# Patient Record
Sex: FEMALE | Race: AMERICAN INDIAN OR ALASKA NATIVE
[De-identification: names, ages, dates, MRNs, and addresses within clinical notes are randomized per-mention and may not be internally consistent; named-entity substitution may affect disease eponyms.]

---

## 2018-03-08 ENCOUNTER — HOSPITAL ENCOUNTER (EMERGENCY)
Dept: HOSPITAL 42 - ED | Age: 63
Discharge: HOME | End: 2018-03-08
Payer: COMMERCIAL

## 2018-03-08 VITALS — TEMPERATURE: 97.5 F | OXYGEN SATURATION: 99 % | RESPIRATION RATE: 18 BRPM

## 2018-03-08 VITALS — SYSTOLIC BLOOD PRESSURE: 144 MMHG | HEART RATE: 66 BPM | DIASTOLIC BLOOD PRESSURE: 80 MMHG

## 2018-03-08 VITALS — BODY MASS INDEX: 25 KG/M2

## 2018-03-08 DIAGNOSIS — E11.9: ICD-10-CM

## 2018-03-08 DIAGNOSIS — I10: ICD-10-CM

## 2018-03-08 DIAGNOSIS — M25.511: Primary | ICD-10-CM

## 2018-03-08 DIAGNOSIS — M25.512: ICD-10-CM

## 2018-03-08 NOTE — RAD
PROCEDURE:  BILATERAL SHOULDER RADIOGRAPHS



HISTORY:

r/o fx



COMPARISON:

None available



TECHNIQUE:

Three views of the shoulder been submitted for evaluation of fracture.



FINDINGS:

No acute fracture or destructive bony lesion identified bilaterally. 

There is no subluxation or dislocation including the bilateral 

acromioclavicular and glenohumeral joints. No destructive bony lesion 

is appreciated bilaterally.  Limited degenerative changes are 

identified in the bilateral sacroiliac acromioclavicular and 

glenohumeral joints. Evidence of limited calcific tendinopathy left 

shoulder.



IMPRESSION:

No acute fracture or dislocation bilaterally.  Calcific tendinopathy 

left shoulder.  Mild degenerative change bilaterally.

## 2018-03-08 NOTE — ED PDOC
Arrival/HPI





- General


Chief Complaint: Trauma


Time Seen by Provider: 03/08/18 08:19


Historian: Patient





- History of Present Illness


Narrative History of Present Illness (Text): 


03/08/18 13:17


Haylee Hernández is a 62 year old female, with no significant past medical 

history, who presents to the emergency department complaining of bilateral 

shoulder pain s/p fall. Patient slipped and fell on her shoulders while 

walking. Patient denies any head injury, LOC, fevers, chills, chest pain, 

shortness of breath, abdominal pain, nausea, vomiting, diarrhea, back pain, 

neck pain, headache, dizziness, or any other complaint. 





Time/Duration: Other (today)


Symptom Course: Unchanged


Activities at Onset: Light


Context: Walking





Past Medical History





- Provider Review


Nursing Documentation Reviewed: Yes





- Reproductive


Menopause: Yes





- Cardiac


Hx Hypertension: Yes





- Pulmonary


Hx Respiratory Disorders: No





- Endocrine/Metabolic


Hx Diabetes Mellitus Type 2: Yes





- Psychiatric


Hx Substance Use: No





- Anesthesia


Hx Anesthesia: No





Family/Social History





- Physician Review


Nursing Documentation Reviewed: Yes


Family/Social History: Unknown Family HX


Smoking Status: Unknown If Ever Smoked


Hx Alcohol Use: No


Hx Substance Use: No





Allergies/Home Meds


Allergies/Adverse Reactions: 


Allergies





No Known Allergies Allergy (Unverified 03/08/18 08:20)


 








Home Medications: 


 Home Meds











 Medication  Instructions  Recorded  Confirmed


 


MetFORMIN [glucOPHAGE] 1,000 mg PO BID 03/08/18 03/08/18


 


Valsartan/Hydrochlorothiazide 1 tab PO DAILY 03/08/18 03/08/18





[Valsartan and Hydrochlorothiazide   





25 mg-320 M]   














Review of Systems





- Physician Review


All systems were reviewed & negative as marked: Yes





- Review of Systems


Constitutional: Normal


Eyes: Normal


ENT: Normal


Respiratory: Normal.  absent: SOB, Cough


Cardiovascular: Normal.  absent: Chest Pain, Palpitations


Gastrointestinal: Normal.  absent: Abdominal Pain, Diarrhea, Nausea, Vomiting


Genitourinary Female: Normal.  absent: Dysuria, Frequency, Hematuria, Urine 

Output Changes


Musculoskeletal: Other (Bilateral shoulder pain).  absent: Back Pain, Neck Pain


Skin: Normal.  absent: Rash


Neurological: Normal.  absent: Headache, Dizziness


Endocrine: Normal


Hemo/Lymphatic: Normal


Psychiatric: Normal





Physical Exam


Vital Signs Reviewed: Yes


Vital Signs











  Temp Pulse Resp BP Pulse Ox


 


 03/08/18 10:30   66  18  144/80  99


 


 03/08/18 08:06  97.5 F L  63  18  163/92 H  99











Temperature: Afebrile


Blood Pressure: Normal


Pulse: Regular


Respiratory Rate: Normal


Appearance: Positive for: Well-Appearing, Non-Toxic, Comfortable


Pain Distress: None


Mental Status: Positive for: Alert and Oriented X 3


Finger Stick Blood Glucose: 167





- Systems Exam


Head: Present: Atraumatic, Normocephalic


Pupils: Present: PERRL


Extroacular Muscles: Present: EOMI


Conjunctiva: Present: Normal


Mouth: Present: Moist Mucous Membranes


Neck: Present: Normal Range of Motion.  No: Meningeal Signs, MIDLINE TENDERNESS

, JVD


Respiratory/Chest: Present: Clear to Auscultation, Good Air Exchange.  No: 

Respiratory Distress, Accessory Muscle Use


Cardiovascular: Present: Regular Rate and Rhythm, Normal S1, S2.  No: Murmurs


Abdomen: Present: Normal Bowel Sounds.  No: Tenderness, Distention, Peritoneal 

Signs


Back: Present: Normal Inspection.  No: CVA Tenderness, Midline Tenderness, 

Paraspinal Tenderness


Upper Extremity: Present: Tenderness (diffused tenderness bilateral shoulders).

  No: Cyanosis, Edema


Lower Extremity: Present: Normal Inspection.  No: Edema


Neurological: Present: GCS=15, CN II-XII Intact, Speech Normal


Skin: Present: Warm, Dry, Normal Color.  No: Rashes


Psychiatric: Present: Alert, Oriented x 3, Normal Insight, Normal Concentration





Medical Decision Making


ED Course and Treatment: 


03/08/18 09:31


Impression:


62 year old female who presents to the emergency department complaining of 

bilateral shoulder pain s/p fall.





Plan:


-- Xray Bilateral shoulders


-- Flexeril


-- Motrin


-- Reassess and disposition





Progress Notes: 


Xray Bilateral Shoulder: 


No acute fracture or destructive bony lesion identified bilaterally. There is 

no subluxation or dislocation including the bilateral acromioclavicular and 

glenohumeral joints. No destructive bony lesion is appreciated bilaterally.  

Limited degenerative changes are identified in the bilateral sacroiliac 

acromioclavicular and glenohumeral joints. Evidence of limited calcific 

tendinopathy left shoulder.





IMPRESSION:


No acute fracture or dislocation bilaterally.  Calcific tendinopathy left 

shoulder.  Mild degenerative change bilaterally.











- Lab Interpretations


Lab Results: 


 Lab Results





03/08/18 08:09: POC Glucose (mg/dL) 167 H











- RAD Interpretation


Radiology Orders: 








03/08/18 08:20


SHOULDER MIN 2 VIEWS BI [RAD] Stat 














- Medication Orders


Current Medication Orders: 











Discontinued Medications





Cyclobenzaprine HCl (Flexeril)  10 mg PO STAT STA


   Stop: 03/08/18 08:22


   Last Admin: 03/08/18 08:41  Dose: 10 mg





Ibuprofen (Motrin Tab)  600 mg PO STAT STA


   Stop: 03/08/18 08:22


   Last Admin: 03/08/18 08:41  Dose: 600 mg





MAR Pain/Vitals


 Document     03/08/18 08:41  SZA  (Rec: 03/08/18 08:41  SZA  [id number])


     Pain Reassessment


      Is This A Pain ReAssessment?               No


     Sleep


      Is patient sleeping during reassessment?   No


     Presence of Pain


      Presence of Pain                           Yes


Re-Assess: MAR Pain/Vitals


 Document     03/08/18 09:41  SZA  (Rec: 03/08/18 11:10  SZA  [id number])


     Pain Reassessment


      Is This A Pain ReAssessment?               Yes


     Sleep


      Is patient sleeping during reassessment?   No


     Presence of Pain


      Presence of Pain                           Yes


     Pain Scale Used


      Pain Scale Used                            Numeric


     Location


      Intensity                                  4


      Scale Used                                 Numeric














- Scribe Statement


The provider has reviewed the documentation as recorded by the Scribmatthew Coello





All medical record entries made by the Scribmatthew were at my direction and 

personally dictated by me. I have reviewed the chart and agree that the record 

accurately reflects my personal performance of the history, physical exam, 

medical decision making, and the department course for this patient. I have 

also personally directed, reviewed, and agree with the discharge instructions 

and disposition.








Disposition/Present on Arrival





- Present on Arrival


Any Indicators Present on Arrival: No


History of DVT/PE: No


History of Uncontrolled Diabetes: No


Urinary Catheter: No


History of Decub. Ulcer: No


History Surgical Site Infection Following: None





- Disposition


Have Diagnosis and Disposition been Completed?: Yes


Diagnosis: 


 Acute pain of both shoulders





Disposition: HOME/ ROUTINE


Disposition Time: 09:30


Condition: IMPROVED


Discharge Instructions (ExitCare):  Shoulder Pain (DC)


Additional Instructions: 





Thank you for letting us take care of you today. The emergency medical care you 

received today was directed at your acute symptoms. If you were prescribed any 

medication, please fill it and take as directed. It may take several days for 

your symptoms to resolve. Return to the Emergency Department if your symptoms 

worsen, do not improve, or if you have any other problems.





Please contact your doctor or call one of the physicians/clinics you have been 

referred to that are listed on the Patient Visit Information form that is 

included in your discharge packet. Bring any paperwork you were given at 

discharge with you along with any medications you are taking to your follow up 

visit. Our treatment cannot replace ongoing medical care by a primary care 

provider (PCP) outside of the emergency department.





Thank you for allowing the [facility] team to be part of your care today.














Follow up with your doctor in 2-3 days.


Prescriptions: 


Cyclobenzaprine [Cyclobenzaprine HCl] 10 mg PO Q8 PRN #20 tab


 PRN Reason: Muscle Spasm


Ibuprofen [Motrin] 600 mg PO Q6 PRN #20 tab


 PRN Reason: Pain, Moderate (4-7)


Referrals: 


[facility] Jayda Cartwright, [Non-Staff] - Follow up with primary


Forms:  [facility] (English)